# Patient Record
(demographics unavailable — no encounter records)

---

## 2025-01-31 NOTE — PHYSICAL EXAM
[Well-appearing] : well-appearing [Normocephalic] : normocephalic [No dysmorphic facial features] : no dysmorphic facial features [No ocular abnormalities] : no ocular abnormalities [Neck supple] : neck supple [No abnormal neurocutaneous stigmata or skin lesions] : no abnormal neurocutaneous stigmata or skin lesions [Straight] : straight [No deformities] : no deformities [Alert] : alert [Well related, good eye contact] : well related, good eye contact [Conversant] : conversant [Normal speech and language] : normal speech and language [Follows instructions well] : follows instructions well [VFF] : VFF [Pupils reactive to light and accommodation] : pupils reactive to light and accommodation [Full extraocular movements] : full extraocular movements [No nystagmus] : no nystagmus [No papilledema] : no papilledema [Normal facial sensation to light touch] : normal facial sensation to light touch [No facial asymmetry or weakness] : no facial asymmetry or weakness [Gross hearing intact] : gross hearing intact [Equal palate elevation] : equal palate elevation [Good shoulder shrug] : good shoulder shrug [Normal tongue movement] : normal tongue movement [Midline tongue, no fasciculations] : midline tongue, no fasciculations [R handed] : R handed [Normal axial and appendicular muscle tone] : normal axial and appendicular muscle tone [Gets up on table without difficulty] : gets up on table without difficulty [No pronator drift] : no pronator drift [Normal finger tapping and fine finger movements] : normal finger tapping and fine finger movements [No abnormal involuntary movements] : no abnormal involuntary movements [5/5 strength in proximal and distal muscles of arms and legs] : 5/5 strength in proximal and distal muscles of arms and legs [Able to do deep knee bend] : able to do deep knee bend [Able to walk on heels] : able to walk on heels [Able to walk on toes] : able to walk on toes [2+ biceps] : 2+ biceps [Knee jerks] : knee jerks [Ankle jerks] : ankle jerks [No ankle clonus] : no ankle clonus [Localizes LT and temperature] : localizes LT and temperature [No dysmetria on FTNT] : no dysmetria on FTNT [Good walking balance] : good walking balance [Normal gait] : normal gait [Able to tandem well] : able to tandem well [Negative Romberg] : negative Romberg [de-identified] : no resp distress, no retractions  [de-identified] : walks well

## 2025-01-31 NOTE — ASSESSMENT
[FreeTextEntry1] : 13 year old with frequent headaches, likely tension headache without clear triggers. Neurologic examination as above.  We discussed the importance of proper diet, hydration, and sleep for individuals with headaches, and the importance of identifying stressors if possible.

## 2025-01-31 NOTE — PHYSICAL EXAM
[Well-appearing] : well-appearing [Normocephalic] : normocephalic [No dysmorphic facial features] : no dysmorphic facial features [No ocular abnormalities] : no ocular abnormalities [Neck supple] : neck supple [No abnormal neurocutaneous stigmata or skin lesions] : no abnormal neurocutaneous stigmata or skin lesions [Straight] : straight [No deformities] : no deformities [Alert] : alert [Well related, good eye contact] : well related, good eye contact [Conversant] : conversant [Normal speech and language] : normal speech and language [Follows instructions well] : follows instructions well [VFF] : VFF [Pupils reactive to light and accommodation] : pupils reactive to light and accommodation [Full extraocular movements] : full extraocular movements [No nystagmus] : no nystagmus [No papilledema] : no papilledema [Normal facial sensation to light touch] : normal facial sensation to light touch [No facial asymmetry or weakness] : no facial asymmetry or weakness [Gross hearing intact] : gross hearing intact [Equal palate elevation] : equal palate elevation [Good shoulder shrug] : good shoulder shrug [Normal tongue movement] : normal tongue movement [Midline tongue, no fasciculations] : midline tongue, no fasciculations [R handed] : R handed [Normal axial and appendicular muscle tone] : normal axial and appendicular muscle tone [Gets up on table without difficulty] : gets up on table without difficulty [Normal finger tapping and fine finger movements] : normal finger tapping and fine finger movements [No pronator drift] : no pronator drift [No abnormal involuntary movements] : no abnormal involuntary movements [5/5 strength in proximal and distal muscles of arms and legs] : 5/5 strength in proximal and distal muscles of arms and legs [Able to do deep knee bend] : able to do deep knee bend [Able to walk on heels] : able to walk on heels [Able to walk on toes] : able to walk on toes [2+ biceps] : 2+ biceps [Knee jerks] : knee jerks [Ankle jerks] : ankle jerks [No ankle clonus] : no ankle clonus [Localizes LT and temperature] : localizes LT and temperature [No dysmetria on FTNT] : no dysmetria on FTNT [Good walking balance] : good walking balance [Normal gait] : normal gait [Able to tandem well] : able to tandem well [Negative Romberg] : negative Romberg [de-identified] : no resp distress, no retractions  [de-identified] : walks well

## 2025-01-31 NOTE — PLAN
[FreeTextEntry1] : Headache log Tylenol or Motrin PRN, no more than 3x per week  Magnesium 500mg daily

## 2025-01-31 NOTE — HISTORY OF PRESENT ILLNESS
[Previous Imaging] : yes [Daily] : daily [None] : The patient is currently asymptomatic [Head Trauma] : no head trauma [Infections] : no infections [Stressors] : no stressors [Aura] : Aura: No [No triggers] : none [de-identified] : Nov 2024 [de-identified] : Initially headaches were intermittent, daily for last 3 weeks  Lifestyle Sleep: 8 hours per night Diet: regular meals Hydration: 32oz bottle x3 during day Caffeine: rare Activities: volleyball  Ophthalmology: Summer 2024 - normal evaluation, PCP screening wnl Dental: no active concerns [de-identified] : MRI brain w/wo - 1/28 - unremarkable per mother [de-identified] : frontal [de-identified] : pressure [de-identified] : mornings, lasting 20 minutes [de-identified] : none [de-identified] : ibuprofen, hydration

## 2025-01-31 NOTE — HISTORY OF PRESENT ILLNESS
[Previous Imaging] : yes [Daily] : daily [None] : The patient is currently asymptomatic [Head Trauma] : no head trauma [Infections] : no infections [Stressors] : no stressors [Aura] : Aura: No [No triggers] : none [de-identified] : Nov 2024 [de-identified] : Initially headaches were intermittent, daily for last 3 weeks  Lifestyle Sleep: 8 hours per night Diet: regular meals Hydration: 32oz bottle x3 during day Caffeine: rare Activities: volleyball  Ophthalmology: Summer 2024 - normal evaluation, PCP screening wnl Dental: no active concerns [de-identified] : MRI brain w/wo - 1/28 - unremarkable per mother [de-identified] : frontal [de-identified] : pressure [de-identified] : mornings, lasting 20 minutes [de-identified] : none [de-identified] : ibuprofen, hydration